# Patient Record
Sex: FEMALE | Race: WHITE | NOT HISPANIC OR LATINO | Employment: OTHER | URBAN - METROPOLITAN AREA
[De-identification: names, ages, dates, MRNs, and addresses within clinical notes are randomized per-mention and may not be internally consistent; named-entity substitution may affect disease eponyms.]

---

## 2023-10-07 ENCOUNTER — OFFICE VISIT (OUTPATIENT)
Dept: FAMILY MEDICINE | Facility: CLINIC | Age: 73
End: 2023-10-07
Payer: MEDICARE

## 2023-10-07 VITALS
HEART RATE: 71 BPM | OXYGEN SATURATION: 97 % | TEMPERATURE: 98.1 F | DIASTOLIC BLOOD PRESSURE: 66 MMHG | SYSTOLIC BLOOD PRESSURE: 100 MMHG
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
## 2023-10-07 PROCEDURE — 99203 OFFICE O/P NEW LOW 30 MIN: CPT

## 2023-10-07 RX ORDER — ESTRADIOL 0.1 MG/G
CREAM VAGINAL
COMMUNITY
Start: 2023-05-03

## 2023-10-07 RX ORDER — LOVASTATIN 20 MG
1 TABLET ORAL AT BEDTIME
COMMUNITY
Start: 2023-05-03 | End: 2024-05-02

## 2023-10-07 RX ORDER — DOXYCYCLINE HYCLATE 100 MG
100 TABLET ORAL 2 TIMES DAILY
Qty: 28 TABLET | Refills: 0 | Status: SHIPPED | OUTPATIENT
Start: 2023-10-07 | End: 2023-10-21

## 2023-10-07 RX ORDER — VALACYCLOVIR HYDROCHLORIDE 1 G/1
TABLET, FILM COATED ORAL
COMMUNITY
Start: 2023-05-03

## 2023-10-07 RX ORDER — VENLAFAXINE 37.5 MG/1
2 TABLET ORAL DAILY
COMMUNITY
Start: 2023-05-03

## 2023-10-07 NOTE — PROGRESS NOTES
Assessment & Plan     Tick bite, unspecified site, initial encounter  Deer tick deeply embedded into the subcutaneous tissue, had some difficulty removing the entire tick but was able to get its entirety out.  Will place on doxycycline for preventative measures.  Did discuss signs and symptoms of possible Lyme's disease and recommend she be seen by her primary care provider should the symptoms arise.    - doxycycline hyclate (VIBRA-TABS) 100 MG tablet; Take 1 tablet (100 mg) by mouth 2 times daily for 14 days         See Patient Instructions    Return in about 2 weeks (around 10/21/2023), or if symptoms worsen or fail to improve.    [facility]    Subjective   Maribell is a 72 year old, presenting for the following health issues:  Urgent Care and Insect Bites (Tick is still embedded above pt collar bone on the right side. Notice it this morning because of pain. No hx of lyme disease)      HPI       Maribell is traveling from [place] where she lives for a wedding here in [place].  Last night they were staying at a hotel and she noticed she had some mild neck pain.  Got up this morning and noticed a deer tick attached to the right nape of her neck.  States her  was grouse hunting this past week with the dog and they drove to [place] in his truck he used for hunting.  Has had no flulike symptoms, fever malaise or body aches.        Review of Systems   Constitutional, HEENT, cardiovascular, pulmonary, gi and gu systems are negative, except as otherwise noted.      Objective    /66   Pulse 71   Temp 98.1  F (36.7  C) (Tympanic)   SpO2 97%   There is no height or weight on file to calculate BMI.  Physical Exam   GENERAL: healthy, alert and no distress  SKIN: Nonengorged deer tick attached to right side of neck with a bull's-eye-like rash around where the tick is embedded.  Was removed with splinter forceps after area was  cleansed with Betadine and alcohol.  Leg of tick remained at site and had to be removed with a #11 blade and forceps. No incision made with blade but was use to aid in removing the leg of the tick.   Patient tolerated procedure well.  Area was cleansed off, bacitracin was placed along with a Band-Aid.